# Patient Record
Sex: FEMALE | Race: WHITE | URBAN - METROPOLITAN AREA
[De-identification: names, ages, dates, MRNs, and addresses within clinical notes are randomized per-mention and may not be internally consistent; named-entity substitution may affect disease eponyms.]

---

## 2018-06-02 ENCOUNTER — OFFICE VISIT (OUTPATIENT)
Dept: URGENT CARE | Facility: URGENT CARE | Age: 63
End: 2018-06-02
Payer: MEDICARE

## 2018-06-02 VITALS
WEIGHT: 190 LBS | SYSTOLIC BLOOD PRESSURE: 106 MMHG | RESPIRATION RATE: 15 BRPM | TEMPERATURE: 98.2 F | HEIGHT: 64 IN | OXYGEN SATURATION: 98 % | HEART RATE: 92 BPM | BODY MASS INDEX: 32.44 KG/M2 | DIASTOLIC BLOOD PRESSURE: 72 MMHG

## 2018-06-02 DIAGNOSIS — B02.9 HERPES ZOSTER WITHOUT COMPLICATION: Primary | ICD-10-CM

## 2018-06-02 PROCEDURE — 99213 OFFICE O/P EST LOW 20 MIN: CPT | Performed by: PHYSICIAN ASSISTANT

## 2018-06-02 RX ORDER — VALACYCLOVIR HYDROCHLORIDE 1 G/1
1000 TABLET, FILM COATED ORAL 3 TIMES DAILY
Qty: 21 TABLET | Refills: 0 | Status: SHIPPED | OUTPATIENT
Start: 2018-06-02

## 2018-06-02 NOTE — PROGRESS NOTES
"SUBJECTIVE:   Wendy Garcia is a 63 year old female presenting for evaluation of   Chief Complaint   Patient presents with     Urgent Care     Derm Problem     rash on back of right leg x 3 days.      Rash on her right inner thigh began a few days ago with pain, and just today she noticed a larger red area with bumps. It is itchy and painful. No fever. No other rashes. She does do a lot of gardening. No known chemical or irritant exposures in this area, however. She has had shingles before.    ROS  No fever. Positive for rash. Positive for stress in life recently.    PMH:    Patient Active Problem List    Diagnosis Date Noted     Bilateral plantar fasciitis 04/30/2014     Priority: Medium     Disorder of bursae and tendons in shoulder region 03/03/2006     Priority: Medium     Problem list name updated by automated process. Provider to review       marlene CERVICALGIA 12/27/2005     Priority: Medium         Current medications:  Current Outpatient Prescriptions   Medication Sig Dispense Refill     CELEBREX 200 MG OR CAPS 1 Capsule daily       nitrofurantoin, macrocrystal-monohydrate, (MACROBID) 100 MG capsule Take 1 capsule (100 mg) by mouth 2 times daily 14 capsule 0     valACYclovir (VALTREX) 1000 mg tablet Take 1 tablet (1,000 mg) by mouth 3 times daily 21 tablet 0     WELLBUTRIN SR## 150 MG OR TB12 1 TABLET TWICE DAILY         Surgical History:  No past surgical history on file.    Family history:  No family history on file.      Social History:  Social History   Substance Use Topics     Smoking status: Former Smoker     Smokeless tobacco: Never Used      Comment: 18 years ago     Alcohol use Not on file     Works as a     OBJECTIVE  /72  Pulse 92  Temp 98.2  F (36.8  C) (Oral)  Resp 15  Ht 5' 4\" (1.626 m)  Wt 190 lb (86.2 kg)  SpO2 98%  BMI 32.61 kg/m2    Physical Exam  General: alert, appears well, NAD. Afebrile.  Skin: on the right proximal, posteromedial thigh, there is a 3in x 5in " area of vesicles on an erythematous base. No superficial crusting. No open sores. No induration, warmth, or tenderness.          ASSESSMENT:      ICD-10-CM    1. Herpes zoster without complication B02.9 valACYclovir (VALTREX) 1000 mg tablet        Medical Decision Making:    Differential Diagnosis:  -Herpes zoster- highly suspect this due to rash's appearance and character  -contact dermatitis- less likely due to affected area being on her inner thigh, and more painful than I would expect for this  -cellulitis- no induration, warmth, no visible entrance area    Serious Comorbid Conditions: none    PLAN:  Patient presents within first 72 hours of symptoms of highly likely Herpes Zoster. She is a candidate for antiviral therapy. Valtrex prescribed.  Discussed other supportive cares. Discussed natural history of shingles.    If not improving or if condition worsens, follow up with your Primary Care Provider    Return precautions provided below in patient instructions.  Patient understood and agreed to plan. Patient was appropriate for discharge.      Patient Instructions   1. Take Valtrex three times daily x 7 days. Take with food as it may upset the stomach.  2. Take Tylenol for pain up to every 6 hours.  3. May take Benadryl or Claritin for itching.  4. May apply hydrocortisone to the rash as needed for itching.  5. Follow up with your doctor if no improvement in 1-2 weeks, sooner if worsening or new symptoms.      Shingles  Shingles is a viral infection caused by the same virus as chicken pox. Anyone who has had chicken pox may get shingles later in life. The virus stays in the body, but remains dormant (asleep). Shingles often occurs in older persons or persons with lowered immunity. But it can affect anyone at any age.  Shingles starts as a tingling patch of skin on one side of the body. Small, painful blisters may then appear. The rash does not spread to the rest of the body.  Exposure to shingles cannot cause  shingles. However, it can cause chicken pox in anyone who has not had chicken pox or has not been vaccinated. The contagious period ends when all blisters have crusted over (generally about 2 weeks after the illness begins).  After the blisters heal, the affected skin may be sensitive or painful for months (neuralgia). This often gradually goes away.  A shingles vaccine is available. This can help prevent shingles or make it less painful. It is generally recommended for adults over the age of 60 who have had chicken pox in the past, but who have never had shingles. Adults over 60 who have had neither chicken pox nor shingles can prevent both diseases with the chicken pox vaccine. Ask your healthcare provider about these vaccines.  Home care    Medicines may be prescribed to help relieve pain. Take these medicines as directed. Ask your healthcare provider or pharmacist before using over-the-counter medicines for helping treat pain and itching.    In certain cases, antiviral medicines may be prescribed to reduce pain, shorten the illness, and prevent neuralgia. Take these medicines as directed.    Compresses made from a solution of cool water mixed with cornstarch or baking soda may help relieve pain and itching.     Gently wash skin daily with soap and water to help prevent infection.  Be certain to rinse off all of the soap, which can be irritating.    Trim fingernails and try not to scratch. Scratching the sores may leave scars.    Stay home from work or school until all blisters have formed a crust and you are no longer contagious.  Follow-up care  Follow up with your healthcare provider or as directed by our staff.  When to seek medical advice    Fever of 100.4 F (38 C) or higher, or as directed by your healthcare provider    Affected skin is on the face or neck and any of the following occur:  ? Headache  ? Eye pain  ? Changes in vision  ? Sores near the eye  ? Weakness of facial muscles    Pain, redness, or  swelling of a joint    Signs of skin infection: colored drainage from the sores, warmth, increasing redness, or increasing pain  Date Last Reviewed: 9/25/2015 2000-2017 [employer]. [address] [place] [address]. All rights reserved. This information is not intended as a substitute for professional medical care. Always follow your healthcare professional's instructions.                  Awilda Louis PA-C  06/02/18 6:32 PM

## 2018-06-02 NOTE — PATIENT INSTRUCTIONS
1. Take Valtrex three times daily x 7 days. Take with food as it may upset the stomach.  2. Take Tylenol for pain up to every 6 hours.  3. May take Benadryl or Claritin for itching.  4. May apply hydrocortisone to the rash as needed for itching.  5. Follow up with your doctor if no improvement in 1-2 weeks, sooner if worsening or new symptoms.      Shingles  Shingles is a viral infection caused by the same virus as chicken pox. Anyone who has had chicken pox may get shingles later in life. The virus stays in the body, but remains dormant (asleep). Shingles often occurs in older persons or persons with lowered immunity. But it can affect anyone at any age.  Shingles starts as a tingling patch of skin on one side of the body. Small, painful blisters may then appear. The rash does not spread to the rest of the body.  Exposure to shingles cannot cause shingles. However, it can cause chicken pox in anyone who has not had chicken pox or has not been vaccinated. The contagious period ends when all blisters have crusted over (generally about 2 weeks after the illness begins).  After the blisters heal, the affected skin may be sensitive or painful for months (neuralgia). This often gradually goes away.  A shingles vaccine is available. This can help prevent shingles or make it less painful. It is generally recommended for adults over the age of 60 who have had chicken pox in the past, but who have never had shingles. Adults over 60 who have had neither chicken pox nor shingles can prevent both diseases with the chicken pox vaccine. Ask your healthcare provider about these vaccines.  Home care    Medicines may be prescribed to help relieve pain. Take these medicines as directed. Ask your healthcare provider or pharmacist before using over-the-counter medicines for helping treat pain and itching.    In certain cases, antiviral medicines may be prescribed to reduce pain, shorten the illness, and prevent neuralgia. Take these  medicines as directed.    Compresses made from a solution of cool water mixed with cornstarch or baking soda may help relieve pain and itching.     Gently wash skin daily with soap and water to help prevent infection.  Be certain to rinse off all of the soap, which can be irritating.    Trim fingernails and try not to scratch. Scratching the sores may leave scars.    Stay home from work or school until all blisters have formed a crust and you are no longer contagious.  Follow-up care  Follow up with your healthcare provider or as directed by our staff.  When to seek medical advice    Fever of 100.4 F (38 C) or higher, or as directed by your healthcare provider    Affected skin is on the face or neck and any of the following occur:  ? Headache  ? Eye pain  ? Changes in vision  ? Sores near the eye  ? Weakness of facial muscles    Pain, redness, or swelling of a joint    Signs of skin infection: colored drainage from the sores, warmth, increasing redness, or increasing pain  Date Last Reviewed: 9/25/2015 2000-2017 [employer]. [address] [place] [address], [place] [address]. All rights reserved. This information is not intended as a substitute for professional medical care. Always follow your healthcare professional's instructions.

## 2018-06-02 NOTE — MR AVS SNAPSHOT
After Visit Summary   6/2/2018    Wendy Garcia    MRN: [id number]           Patient Information     Date Of Birth          1955        Visit Information        Provider Department      6/2/2018 5:55 PM Awilda Louis PA-C [facility]        Today's Diagnoses     Herpes zoster without complication    -  1      Care Instructions    1. Take Valtrex three times daily x 7 days. Take with food as it may upset the stomach.  2. Take Tylenol for pain up to every 6 hours.  3. May take Benadryl or Claritin for itching.  4. May apply hydrocortisone to the rash as needed for itching.  5. Follow up with your doctor if no improvement in 1-2 weeks, sooner if worsening or new symptoms.      Shingles  Shingles is a viral infection caused by the same virus as chicken pox. Anyone who has had chicken pox may get shingles later in life. The virus stays in the body, but remains dormant (asleep). Shingles often occurs in older persons or persons with lowered immunity. But it can affect anyone at any age.  Shingles starts as a tingling patch of skin on one side of the body. Small, painful blisters may then appear. The rash does not spread to the rest of the body.  Exposure to shingles cannot cause shingles. However, it can cause chicken pox in anyone who has not had chicken pox or has not been vaccinated. The contagious period ends when all blisters have crusted over (generally about 2 weeks after the illness begins).  After the blisters heal, the affected skin may be sensitive or painful for months (neuralgia). This often gradually goes away.  A shingles vaccine is available. This can help prevent shingles or make it less painful. It is generally recommended for adults over the age of 60 who have had chicken pox in the past, but who have never had shingles. Adults over 60 who have had neither chicken pox nor shingles can prevent both diseases with the chicken pox vaccine. Ask your  healthcare provider about these vaccines.  Home care    Medicines may be prescribed to help relieve pain. Take these medicines as directed. Ask your healthcare provider or pharmacist before using over-the-counter medicines for helping treat pain and itching.    In certain cases, antiviral medicines may be prescribed to reduce pain, shorten the illness, and prevent neuralgia. Take these medicines as directed.    Compresses made from a solution of cool water mixed with cornstarch or baking soda may help relieve pain and itching.     Gently wash skin daily with soap and water to help prevent infection.  Be certain to rinse off all of the soap, which can be irritating.    Trim fingernails and try not to scratch. Scratching the sores may leave scars.    Stay home from work or school until all blisters have formed a crust and you are no longer contagious.  Follow-up care  Follow up with your healthcare provider or as directed by our staff.  When to seek medical advice    Fever of 100.4 F (38 C) or higher, or as directed by your healthcare provider    Affected skin is on the face or neck and any of the following occur:  ? Headache  ? Eye pain  ? Changes in vision  ? Sores near the eye  ? Weakness of facial muscles    Pain, redness, or swelling of a joint    Signs of skin infection: colored drainage from the sores, warmth, increasing redness, or increasing pain  Date Last Reviewed: 9/25/2015 2000-2017 [employer]. [address] [place] [address], [place] [address]. All rights reserved. This information is not intended as a substitute for professional medical care. Always follow your healthcare professional's instructions.                Follow-ups after your visit        Follow-up notes from your care team     Return if symptoms worsen or fail to improve.      Who to contact     If you have questions or need follow up information about today's clinic visit or your schedule please contact [facility]  "URGENT CARE directly at [phone].  Normal or non-critical lab and imaging results will be communicated to you by MyChart, letter or phone within 4 business days after the clinic has received the results. If you do not hear from us within 7 days, please contact the clinic through MyChart or phone. If you have a critical or abnormal lab result, we will notify you by phone as soon as possible.  Submit refill requests through [employer] or call your pharmacy and they will forward the refill request to us. Please allow 3 business days for your refill to be completed.          Additional Information About Your Visit        Care EveryWhere ID     This is your Care EveryWhere ID. This could be used by other organizations to access your [facility] medical records  [id number]        Your Vitals Were     Pulse Temperature Respirations Height Pulse Oximetry BMI (Body Mass Index)    92 98.2  F (36.8  C) (Oral) 15 5' 4\" (1.626 m) 98% 32.61 kg/m2       Blood Pressure from Last 3 Encounters:   06/02/18 106/72   08/08/15 108/72   07/11/10 120/70    Weight from Last 3 Encounters:   06/02/18 190 lb (86.2 kg)   08/08/15 188 lb (85.3 kg)   12/15/06 170 lb (77.1 kg)              Today, you had the following     No orders found for display         Today's Medication Changes          These changes are accurate as of 6/2/18  6:23 PM.  If you have any questions, ask your nurse or doctor.               Start taking these medicines.        Dose/Directions    valACYclovir 1000 mg tablet   Commonly known as:  VALTREX   Used for:  Herpes zoster without complication   Started by:  Awilda Louis PA-C        Dose:  1000 mg   Take 1 tablet (1,000 mg) by mouth 3 times daily   Quantity:  21 tablet   Refills:  0            Where to get your medicines      These medications were sent to [id number] - [place], [place] - [address] AT [facility]  [address], [place] [address]     Phone:  [phone]     " valACYclovir 1000 mg tablet                Primary Care Provider Fax #    Physician No Ref-Primary [phone]       No address on file        Equal Access to Services     LEO PASCAL : Hadii aad ku hadlyndsay Burgess, jesi tan, dionne lawrence, delvin [facility] [place] [phone].    ATENCIÓN: Si habla español, tiene a presley disposición servicios gratuitos de asistencia lingüística. Llame al [phone].    We comply with applicable federal civil rights laws and [place] laws. We do not discriminate on the basis of race, color, national origin, age, disability, sex, sexual orientation, or gender identity.            Thank you!     Thank you for choosing [facility]  for your care. Our goal is always to provide you with excellent care. Hearing back from our patients is one way we can continue to improve our services. Please take a few minutes to complete the written survey that you may receive in the mail after your visit with us. Thank you!             Your Updated Medication List - Protect others around you: Learn how to safely use, store and throw away your medicines at www.disposemymeds.org.          This list is accurate as of 6/2/18  6:23 PM.  Always use your most recent med list.                   Brand Name Dispense Instructions for use Diagnosis    celeBREX 200 MG capsule   Generic drug:  celecoxib      1 Capsule daily        nitroFURantoin (macrocrystal-monohydrate) 100 MG capsule    MACROBID    14 capsule    Take 1 capsule (100 mg) by mouth 2 times daily    UTI (urinary tract infection)       valACYclovir 1000 mg tablet    VALTREX    21 tablet    Take 1 tablet (1,000 mg) by mouth 3 times daily    Herpes zoster without complication       WELLBUTRIN  MG 12 hr tablet   Generic drug:  buPROPion      1 TABLET TWICE DAILY

## 2019-12-20 ENCOUNTER — TRANSFERRED RECORDS (OUTPATIENT)
Dept: HEALTH INFORMATION MANAGEMENT | Facility: CLINIC | Age: 64
End: 2019-12-20

## 2019-12-20 ENCOUNTER — MEDICAL CORRESPONDENCE (OUTPATIENT)
Dept: HEALTH INFORMATION MANAGEMENT | Facility: CLINIC | Age: 64
End: 2019-12-20

## 2020-05-20 ENCOUNTER — RECORDS - HEALTHEAST (OUTPATIENT)
Dept: ADMINISTRATIVE | Facility: OTHER | Age: 65
End: 2020-05-20

## 2020-05-21 ENCOUNTER — RECORDS - HEALTHEAST (OUTPATIENT)
Dept: ADMINISTRATIVE | Facility: OTHER | Age: 65
End: 2020-05-21

## 2020-05-21 ENCOUNTER — AMBULATORY - HEALTHEAST (OUTPATIENT)
Dept: CARDIOLOGY | Facility: CLINIC | Age: 65
End: 2020-05-21

## 2020-05-21 DIAGNOSIS — R55 SYNCOPE: ICD-10-CM

## 2020-06-01 ENCOUNTER — HOSPITAL ENCOUNTER (OUTPATIENT)
Dept: MRI IMAGING | Facility: CLINIC | Age: 65
Discharge: HOME | End: 2020-06-01
Attending: PSYCHIATRY & NEUROLOGY

## 2020-06-01 ENCOUNTER — HOSPITAL ENCOUNTER (OUTPATIENT)
Dept: ULTRASOUND IMAGING | Facility: CLINIC | Age: 65
Discharge: HOME | End: 2020-06-01
Attending: PSYCHIATRY & NEUROLOGY

## 2020-06-01 DIAGNOSIS — R55 SYNCOPE: ICD-10-CM

## 2020-06-01 LAB
CREAT BLD-MCNC: 0.8 MG/DL (ref 0.6–1.1)
GFR SERPL CREATININE-BSD FRML MDRD: >60 ML/MIN/1.73M2

## 2020-06-05 ENCOUNTER — HOSPITAL ENCOUNTER (OUTPATIENT)
Dept: CARDIOLOGY | Facility: CLINIC | Age: 65
Discharge: HOME | End: 2020-06-05
Attending: PSYCHIATRY & NEUROLOGY

## 2020-06-05 DIAGNOSIS — R55 SYNCOPE: ICD-10-CM

## 2020-06-05 LAB
AORTIC ROOT: 3.2 CM
ATRIAL RATE - MUSE: 76 BPM
BSA FOR ECHO PROCEDURE: 1.94 M2
CV BLOOD PRESSURE: ABNORMAL MMHG
CV ECHO HEIGHT: 64 IN
CV ECHO WEIGHT: 185 LBS
DIASTOLIC BLOOD PRESSURE - MUSE: NORMAL
DOP CALC LVOT AREA: 2.54 CM2
DOP CALC LVOT DIAMETER: 1.8 CM
DOP CALC LVOT PEAK VEL: 126 CM/S
DOP CALC LVOT STROKE VOLUME: 64.3 CM3
DOP CALCLVOT PEAK VEL VTI: 25.3 CM
EJECTION FRACTION: 60 % (ref 55–75)
FRACTIONAL SHORTENING: 37.4 % (ref 28–44)
INTERPRETATION ECG - MUSE: NORMAL
INTERVENTRICULAR SEPTUM IN END DIASTOLE: 1.2 CM (ref 0.6–0.9)
IVS/PW RATIO: 1.2
LA AREA 1: 16 CM2
LA AREA 2: 16.5 CM2
LEFT ATRIUM LENGTH: 5.3 CM
LEFT ATRIUM SIZE: 3.2 CM
LEFT ATRIUM TO AORTIC ROOT RATIO: 1 NO UNITS
LEFT ATRIUM VOLUME INDEX: 21.8 ML/M2
LEFT ATRIUM VOLUME: 42.3 ML
LEFT VENTRICLE CARDIAC INDEX: 2.8 L/MIN/M2
LEFT VENTRICLE CARDIAC OUTPUT: 5.3 L/MIN
LEFT VENTRICLE DIASTOLIC VOLUME INDEX: 30.5 CM3/M2 (ref 29–61)
LEFT VENTRICLE DIASTOLIC VOLUME: 59.1 CM3 (ref 46–106)
LEFT VENTRICLE HEART RATE: 83 BPM
LEFT VENTRICLE MASS INDEX: 96.7 G/M2
LEFT VENTRICLE SYSTOLIC VOLUME INDEX: 12.2 CM3/M2 (ref 8–24)
LEFT VENTRICLE SYSTOLIC VOLUME: 23.6 CM3 (ref 14–42)
LEFT VENTRICULAR INTERNAL DIMENSION IN DIASTOLE: 4.7 CM (ref 3.8–5.2)
LEFT VENTRICULAR INTERNAL DIMENSION IN SYSTOLE: 2.94 CM (ref 2.2–3.5)
LEFT VENTRICULAR MASS: 187.5 G
LEFT VENTRICULAR OUTFLOW TRACT MEAN GRADIENT: 3 MMHG
LEFT VENTRICULAR OUTFLOW TRACT MEAN VELOCITY: 82 CM/S
LEFT VENTRICULAR OUTFLOW TRACT PEAK GRADIENT: 6 MMHG
LEFT VENTRICULAR POSTERIOR WALL IN END DIASTOLE: 1 CM (ref 0.6–0.9)
LV STROKE VOLUME INDEX: 33.2 ML/M2
MITRAL VALVE DECELERATION SLOPE: 4050 MM/S2
MITRAL VALVE E/A RATIO: 0.9
MITRAL VALVE PRESSURE HALF-TIME: 67 MS
MV AVERAGE E/E' RATIO: 10.3 CM/S
MV DECELERATION TIME: 229 MS
MV E'TISSUE VEL-LAT: 10.8 CM/S
MV E'TISSUE VEL-MED: 7.25 CM/S
MV LATERAL E/E' RATIO: 8.6
MV MEDIAL E/E' RATIO: 12.8
MV PEAK A VELOCITY: 107 CM/S
MV PEAK E VELOCITY: 92.6 CM/S
MV VALVE AREA PRESSURE 1/2 METHOD: 3.3 CM2
NUC REST DIASTOLIC VOLUME INDEX: 2960 LBS
NUC REST SYSTOLIC VOLUME INDEX: 64 IN
P AXIS - MUSE: 69 DEGREES
PR INTERVAL - MUSE: 160 MS
QRS DURATION - MUSE: 80 MS
QT - MUSE: 372 MS
QTC - MUSE: 418 MS
R AXIS - MUSE: 65 DEGREES
SYSTOLIC BLOOD PRESSURE - MUSE: NORMAL
T AXIS - MUSE: 52 DEGREES
TRICUSPID REGURGITATION PEAK PRESSURE GRADIENT: 11.2 MMHG
TRICUSPID VALVE ANULAR PLANE SYSTOLIC EXCURSION: 2.4 CM
TRICUSPID VALVE PEAK REGURGITANT VELOCITY: 167 CM/S
VENTRICULAR RATE- MUSE: 76 BPM

## 2020-06-05 ASSESSMENT — MIFFLIN-ST. JEOR: SCORE: 1354.15

## 2020-06-10 ENCOUNTER — OFFICE VISIT (OUTPATIENT)
Dept: NEUROLOGY | Facility: CLINIC | Age: 65
End: 2020-06-10
Payer: COMMERCIAL

## 2020-06-10 VITALS — BODY MASS INDEX: 31.58 KG/M2 | WEIGHT: 185 LBS | HEIGHT: 64 IN

## 2020-06-10 DIAGNOSIS — G90.01 CAROTID SINUS SYNCOPE: Primary | ICD-10-CM

## 2020-06-10 PROBLEM — I10 HYPERTENSION: Status: ACTIVE | Noted: 2020-03-03

## 2020-06-10 PROCEDURE — 99214 OFFICE O/P EST MOD 30 MIN: CPT | Mod: 95 | Performed by: PSYCHIATRY & NEUROLOGY

## 2020-06-10 RX ORDER — LOSARTAN POTASSIUM 50 MG/1
TABLET ORAL
COMMUNITY
Start: 2020-05-27

## 2020-06-10 ASSESSMENT — MIFFLIN-ST. JEOR: SCORE: 1359.15

## 2020-06-10 NOTE — PROGRESS NOTES
"NEUROLOGY FOLLOW UP VISIT  NOTE       [facility]UROLOG[facility] [place]  [address] [place] [address]  Tel: [phone]  Fax: [phone]  [email or web address]     ISABELA Fish 1955, MRN [id number]  PCP: No Ref-Primary, Physician, None    Date: 6/10/2020     ASSESSMENT & PLAN     Diagnosis code: Data Unavailable    Syncope    Patient is a 65-year-old female with history of hypertension, ADHD who was referred for an episode of syncope on 3/27/2020.  Work-up so far includes MRI of the head with and without contrast, echocardiogram, EKG and carotid ultrasound that was unremarkable.  EEG is still pending    Postconcussion syndrome    Patient continues to have memory difficulty and somewhat slow in answering question that likely is due to postconcussion syndrome and I have reassured her that symptoms do tend to improve.  Follow-up will be in 3 months.      Thank you again for this referral, please feel free to contact me if you have any questions.    Moris Manuel MD  [facility] NEUROLOGY, [place]    VIDEO VISIT CONSENT  Patient is being evaluated via a billable video visit. The patient has been notified of following:     \"This video visit will be conducted via a call between you and your physician/provider. We have found that certain health care needs can be provided without the need for an in-person physical exam. This service lets us provide the care you need with a video conversation. If a prescription is necessary we can send it directly to your pharmacy. If lab work is needed we can place an order for that and you can then stop by our lab to have the test done at a later time. If during the course of the call the physician/provider feels a video visit is not appropriate, you will not be charged for this service.\"    Physician has received verbal consent for a Video Visit from the patient? yes  Patient would like the video invitation sent by: E mail     VIDEO VISIT DETAILS  Type " of service: Video Visit  Video Start Time: 12:10 PM  Video End Time (time video stopped): 12:23 PM  Originating Location (Patient Location): Patient's Home  Distant Location (provider location): [facility] [place]   Mode of Communication: Video Conference via [x][employer] [][employer]ity     HISTORY OF PRESENT ILLNESS     We have been requested by Dr. Troncoso to evaluate Wendy Garcia who is a 65 year old  female for episode of syncope    Patient is a 65-year-old female with history of hypertension, ADHD who was referred for evaluation of her syncope on 3/27/2020.  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  She started some antihypertensive medication and as she stood up she passed out.  As she lives alone she is not sure how long she was out.  When she regained consciousness she realized that she had vomited and broken her glasses and had a bruise on her left eye.  She was fatigued the following day.  Subsequently she had MRI of the head, echocardiogram, EKG and carotid ultrasound that was normal.  EEG is still pending.  Initially she was complaining of word finding difficulty and trouble spelling     PROBLEM LIST   Patient Active Problem List    Diagnosis Date Noted     Hypertension 03/03/2020     Priority: Medium     Vitamin D deficiency 12/07/2015     Priority: Medium     Bilateral plantar fasciitis 04/30/2014     Priority: Medium     Posttraumatic stress disorder 02/02/2011     Priority: Medium     Dysthymic disorder 12/10/2010     Priority: Medium     Last Assessment & Plan:   Will continue with previous treatment plan.   Focus is to help her stay in the present moment, to stay consistent in her search for employment, practice self care, work at decreasing consumption of alcohol, learning to not personalize other's behaviors. Gerri Cisneros PsyD, LP  11/5/2015   2:56 PM       Attention deficit  "hyperactivity disorder (ADHD) 11/03/2010     Priority: Medium     Disorder of bursae and tendons in shoulder region 03/03/2006     Priority: Medium     Problem list name updated by automated process. Provider to review       marlene CERVICALGIA 12/27/2005     Priority: Medium        PAST MEDICAL & SURGICAL HISTORY     Past Medical History:   Patient  has no past medical history on file.    Surgical History:  She  has no past surgical history on file.     SOCIAL HISTORY     Reviewed, and she  reports that she has quit smoking. She has never used smokeless tobacco.     FAMILY HISTORY     Reviewed, and family history includes Cancer in her brother, father, mother, and sister; Heart Disease in her brother; Hypertension in her brother.     ALLERGIES     Allergies   Allergen Reactions     Cats      Mold      Penicillins Other (See Comments)         REVIEW OF SYSTEMS     A comprehensive review of systems was negative.     HOME MEDICATIONS     Prior to Admission medications    Medication Sig Start Date End Date Taking? Authorizing Provider   losartan (COZAAR) 50 MG tablet  5/27/20  Yes Reported, Patient   CELEBREX 200 MG OR CAPS 1 Capsule daily    Reported, Patient   nitrofurantoin, macrocrystal-monohydrate, (MACROBID) 100 MG capsule Take 1 capsule (100 mg) by mouth 2 times daily  Patient not taking: Reported on 6/10/2020 8/8/15   Yolanda Pulido MD   valACYclovir (VALTREX) 1000 mg tablet Take 1 tablet (1,000 mg) by mouth 3 times daily  Patient not taking: Reported on 6/10/2020 6/2/18   Awilda Louis PA-C   WELLBUTRIN SR## 150 MG OR TB12 1 TABLET TWICE DAILY    Reported, Patient         PHYSICAL EXAM     Vital signs  Ht 1.626 m (5' 4\")   Wt 83.9 kg (185 lb)   BMI 31.76 kg/m      Weight:   185 lbs 0 oz    General Physical Exam: Patient is alert and oriented x 3. Vital signs were reviewed and are documented in EMR. Neck was supple, no carotid bruit, thyromegaly, JVD or lymphadenopathy noted.  Neurological " Exam:  Patient is alert and oriented times 4 in no acute distress. Vital signs were reviewed and are documented in electronic medical record. Speech was normal patient was able to name objects, parts of objects. Extraocular movements are intact face was symmetrical tongue was midline. Patient moves all 4 extremities equally. Finger-nose testing was normal. Gait testing was normal. Sensation could not be tested     DIAGNOSTIC STUDIES     PERTINENT RADIOLOGY  Following imaging studies were reviewed      EKG  Normal sinus rhythm   Normal ECG   No previous ECGs available     MRI  6/1/20  1.  No acute intracranial process.  2.  Generalized brain atrophy and presumed microvascular ischemic changes as detailed above.    EEG  pending    Echocardiogram  6/5/20    No previous study for comparison.    Normal left ventricular size. Mild left ventricular hypertrophy.    Left ventricle ejection fraction is normal. The calculated left ventricular ejection fraction is 60%.    Normal right ventricular size and systolic function.    No significant valve abnormality noted.      Carotid Ultrasound  6/1/20  1.  Mild plaque formation, velocities consistent with less than 50% stenosis in the right internal carotid artery.  2.  Mild plaque formation, velocities consistent with less than 50% stenosis in the left internal carotid artery.  3.  Flow within the vertebral arteries is antegrade.      PERTINENT LABS  Following labs were reviewed    No visits with results within 3 Month(s) from this visit.   Latest known visit with results is:   Office Visit on 08/08/2015   Component Date Value Ref Range Status     Color Urine 08/08/2015 Yellow   Final     Appearance Urine 08/08/2015 Clear   Final     Glucose Urine 08/08/2015 Negative  NEG mg/dL Final     Bilirubin Urine 08/08/2015 Negative  NEG Final     Ketones Urine 08/08/2015 Negative  NEG mg/dL Final     Specific Gravity Urine 08/08/2015 1.015  1.003 - 1.035 Final     Blood Urine 08/08/2015  Moderate* NEG Final     pH Urine 08/08/2015 7.0  5.0 - 7.0 pH Final     Protein Albumin Urine 08/08/2015 30* NEG mg/dL Final     Urobilinogen Urine 08/08/2015 0.2  0.2 - 1.0 EU/dL Final     Nitrite Urine 08/08/2015 Negative  NEG Final     Leukocyte Esterase Urine 08/08/2015 Large* NEG Final     Source 08/08/2015 Midstream Urine   Final     Specimen Description 08/08/2015 Midstream Urine   Final     Culture Micro 08/08/2015 10,000 to 50,000 colonies/mL Escherichia coli*  Final     Micro Report Status 08/08/2015 FINAL 08/10/2015   Final     Organism: 08/08/2015 10,000 to 50,000 colonies/mL Escherichia coli   Final     WBC Urine 08/08/2015 25-50* 0 - 2 /HPF Final     RBC Urine 08/08/2015 10-25* 0 - 2 /HPF Final     Squamous Epithelial /LPF Urine 08/08/2015 Few  FEW /LPF Final     Bacteria Urine 08/08/2015 Moderate* NEG /HPF Final        Total time spent for face to face visit, reviewing labs/imaging studies, counseling and coordination of care was: 30 Minutes More than 50% of this time was spent on counseling and coordination of care.      This note was dictated using voice recognition software.  Any grammatical or context distortions are unintentional and inherent to the software.

## 2020-06-10 NOTE — PATIENT INSTRUCTIONS
"  Patient Education   Concussion Discharge Instructions  You were seen today for signs of a concussion. The symptoms will vary, depending on the nature of your injury and your health. You may have: headache, confusion, nausea (feel sick to your stomach), vomiting (throwing up) and problems with memory, concentrating or sleep. You may feel dizzy, irritable, and tired.   Children and teens may need help from their parents, teachers and coaches to watch for symptoms as they recover.  Follow-up  It is important for you to see a doctor for follow-up care to see how you are recovering. Please see your primary doctor within the next 5 to 7 days. You may have also been referred to the Concussion  service. They will contact you and arrange a follow-up visit if needed. If you need help sooner, you may call them at [phone].  Warning signs  Call your doctor or come back to Emergency if you suddenly have any of these symptoms:    Headaches that get worse    Feeling more and more drowsy    You keep repeating yourself    Strange behavior    Seizures    Repeat vomiting (throwing up)    Trouble walking    Growing confusion    Feeling more irritable    Neck pain that gets worse    Slurred speech    Weakness or numbness    Loss of consciousness    Fluid or blood coming from ears or nose  Self-care    Get lots of rest and get enough sleep at night. Take daytime naps or rest if you feel tired.    Limit physical activity and \"thinking\" activities. These can make symptoms worse.  ? Physical activity includes gym, sports, weight training, running, exercise and heavy lifting.  ? Thinking activities include homework, class work, job-related work and screen time (phone, computer, tablet, TV and video games).    Stick to a healthy diet and drink lots of fluids.    As symptoms improve, you may slowly return to your daily activities. If symptoms get worse   or return, reduce your activity.    Know that it is normal to feel sad and " frustrated when you do not feel right and are less active.  Going back to work    Your care team will tell you when you are ready to return to work.    Limit the amount of work you do soon after your injury. This may speed healing. Take breaks if your symptoms get worse. You should also reduce your physical activity as well as activities that require a lot of thinking until you see your doctor.    You may need shorter work days and a lighter workload.    Avoid heavy lifting, working with machinery, driving and working at heights until your symptoms are gone or you are cleared by a doctor.  Returning to sports    Never return to play if you have any symptoms. A full recovery will reduce the chances of getting hurt again. Remember, it is better to miss one or two games than a whole season.    You should rest from all physical activity until you see your doctor. Generally, if all symptoms have completely cleared, your doctor can help guide you to slowly return to sports. If symptoms return or worsen, stop the activity and see your doctor.    Important: If you are in an organized sport and under age 18, you will need written consent from a healthcare provider before you return to sports. Typically, this will be your primary care or sports medicine doctor. Please make an appointment.  Going back to school    If you are still having symptoms, you may need extra help at school.    Tell your teachers and school nurse about your injury and symptoms. Ask them to watch for problems with learning, memory and concentrating. Symptoms may get worse when you do schoolwork, and you may become more irritable.    You may need shorter school days, a reduced workload, and to postpone testing.    Do not drive or take gym class (physical activity) until cleared by a doctor.    For informational purposes only. Not to replace the advice of your health care provider.   2009 [employer]. Used with permission.  "This form is adapted from the \"Heads Up: Brain Injury in Your Practice\" tool kit developed by the Centers for Disease Control and Prevention (CDC). All rights reserved. [facility]. [employer] 924200ro - Rev 03/17.       "

## 2020-06-10 NOTE — NURSING NOTE
Chief Complaint   Patient presents with     Results     Follow up on EKG, Echo, MRI and US. Has not had EEG yet Email: [email or web address]     Sandy Hurtado CMA on 6/10/2020 at 11:31 AM

## 2020-06-10 NOTE — LETTER
"    6/10/2020         RE: Wendy Garcia  [address] [place] [address]        Dear Colleague,    Thank you for referring your patient, Wendy Garcia, to the [facility] NEUROLOG[facility] [place]. Please see a copy of my visit note below.    NEUROLOGY FOLLOW UP VISIT  NOTE       [facility] NEUROLOG[facility] [place]  [address] [place] [address]  Tel: [phone]  Fax: [phone]  [email or web address]     Wendy Garcia,  1955, MRN [id number]  PCP: No Ref-Primary, Physician, None    Date: 6/10/2020     ASSESSMENT & PLAN     Diagnosis code: Data Unavailable    Syncope    Patient is a 65-year-old female with history of hypertension, ADHD who was referred for an episode of syncope on 3/27/2020.  Work-up so far includes MRI of the head with and without contrast, echocardiogram, EKG and carotid ultrasound that was unremarkable.  EEG is still pending    Postconcussion syndrome    Patient continues to have memory difficulty and somewhat slow in answering question that likely is due to postconcussion syndrome and I have reassured her that symptoms do tend to improve.  Follow-up will be in 3 months.      Thank you again for this referral, please feel free to contact me if you have any questions.    Moris Manuel MD  [facility] NEUROLOGY, [place]    VIDEO VISIT CONSENT  Patient is being evaluated via a billable video visit. The patient has been notified of following:     \"This video visit will be conducted via a call between you and your physician/provider. We have found that certain health care needs can be provided without the need for an in-person physical exam. This service lets us provide the care you need with a video conversation. If a prescription is necessary we can send it directly to your pharmacy. If lab work is needed we can place an order for that and you can then stop by our lab to have the test done at a later time. If during the course of the call the " "physician/provider feels a video visit is not appropriate, you will not be charged for this service.\"    Physician has received verbal consent for a Video Visit from the patient? yes  Patient would like the video invitation sent by: E mail     VIDEO VISIT DETAILS  Type of service: Video Visit  Video Start Time: 12:10 PM  Video End Time (time video stopped): 12:23 PM  Originating Location (Patient Location): Patient's Home  Distant Location (provider location): [facility] [place]   Mode of Communication: Video Conference via [x][employer] []Doximity     HISTORY OF PRESENT ILLNESS     We have been requested by Dr. Troncoso to evaluate Wendy Garcia who is a 65 year old  female for episode of syncope    Patient is a 65-year-old female with history of hypertension, ADHD who was referred for evaluation of her syncope on 3/27/2020.  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  She started some antihypertensive medication and as she stood up she passed out.  As she lives alone she is not sure how long she was out.  When she regained consciousness she realized that she had vomited and broken her glasses and had a bruise on her left eye.  She was fatigued the following day.  Subsequently she had MRI of the head, echocardiogram, EKG and carotid ultrasound that was normal.  EEG is still pending.  Initially she was complaining of word finding difficulty and trouble spelling     PROBLEM LIST   Patient Active Problem List    Diagnosis Date Noted     Hypertension 03/03/2020     Priority: Medium     Vitamin D deficiency 12/07/2015     Priority: Medium     Bilateral plantar fasciitis 04/30/2014     Priority: Medium     Posttraumatic stress disorder 02/02/2011     Priority: Medium     Dysthymic disorder 12/10/2010     Priority: Medium     Last Assessment & Plan:   Will continue with previous treatment plan.   Focus is " "to help her stay in the present moment, to stay consistent in her search for employment, practice self care, work at decreasing consumption of alcohol, learning to not personalize other's behaviors. Gerri Cisneros [place] LP  11/5/2015   2:56 PM       Attention deficit hyperactivity disorder (ADHD) 11/03/2010     Priority: Medium     Disorder of bursae and tendons in shoulder region 03/03/2006     Priority: Medium     Problem list name updated by automated process. Provider to review       marlene CERVICALGIA 12/27/2005     Priority: Medium        PAST MEDICAL & SURGICAL HISTORY     Past Medical History:   Patient  has no past medical history on file.    Surgical History:  She  has no past surgical history on file.     SOCIAL HISTORY     Reviewed, and she  reports that she has quit smoking. She has never used smokeless tobacco.     FAMILY HISTORY     Reviewed, and family history includes Cancer in her brother, father, mother, and sister; Heart Disease in her brother; Hypertension in her brother.     ALLERGIES     Allergies   Allergen Reactions     Cats      Mold      Penicillins Other (See Comments)         REVIEW OF SYSTEMS     A comprehensive review of systems was negative.     HOME MEDICATIONS     Prior to Admission medications    Medication Sig Start Date End Date Taking? Authorizing Provider   losartan (COZAAR) 50 MG tablet  5/27/20  Yes Reported, Patient   CELEBREX 200 MG OR CAPS 1 Capsule daily    Reported, Patient   nitrofurantoin, macrocrystal-monohydrate, (MACROBID) 100 MG capsule Take 1 capsule (100 mg) by mouth 2 times daily  Patient not taking: Reported on 6/10/2020 8/8/15   Yolanda Pulido MD   valACYclovir (VALTREX) 1000 mg tablet Take 1 tablet (1,000 mg) by mouth 3 times daily  Patient not taking: Reported on 6/10/2020 6/2/18   Awilda Louis PA-C   WELLBUTRIN SR## 150 MG OR TB12 1 TABLET TWICE DAILY    Reported, Patient         PHYSICAL EXAM     Vital signs  Ht 1.626 m (5' 4\")   Wt 83.9 kg " (185 lb)   BMI 31.76 kg/m      Weight:   185 lbs 0 oz    General Physical Exam: Patient is alert and oriented x 3. Vital signs were reviewed and are documented in EMR. Neck was supple, no carotid bruit, thyromegaly, JVD or lymphadenopathy noted.  Neurological Exam:  Patient is alert and oriented times 4 in no acute distress. Vital signs were reviewed and are documented in electronic medical record. Speech was normal patient was able to name objects, parts of objects. Extraocular movements are intact face was symmetrical tongue was midline. Patient moves all 4 extremities equally. Finger-nose testing was normal. Gait testing was normal. Sensation could not be tested     DIAGNOSTIC STUDIES     PERTINENT RADIOLOGY  Following imaging studies were reviewed      EKG  Normal sinus rhythm   Normal ECG   No previous ECGs available     MRI  6/1/20  1.  No acute intracranial process.  2.  Generalized brain atrophy and presumed microvascular ischemic changes as detailed above.    EEG  pending    Echocardiogram  6/5/20    No previous study for comparison.    Normal left ventricular size. Mild left ventricular hypertrophy.    Left ventricle ejection fraction is normal. The calculated left ventricular ejection fraction is 60%.    Normal right ventricular size and systolic function.    No significant valve abnormality noted.      Carotid Ultrasound  6/1/20  1.  Mild plaque formation, velocities consistent with less than 50% stenosis in the right internal carotid artery.  2.  Mild plaque formation, velocities consistent with less than 50% stenosis in the left internal carotid artery.  3.  Flow within the vertebral arteries is antegrade.      PERTINENT LABS  Following labs were reviewed    No visits with results within 3 Month(s) from this visit.   Latest known visit with results is:   Office Visit on 08/08/2015   Component Date Value Ref Range Status     Color Urine 08/08/2015 Yellow   Final     Appearance Urine 08/08/2015 Clear    Final     Glucose Urine 08/08/2015 Negative  NEG mg/dL Final     Bilirubin Urine 08/08/2015 Negative  NEG Final     Ketones Urine 08/08/2015 Negative  NEG mg/dL Final     Specific Gravity Urine 08/08/2015 1.015  1.003 - 1.035 Final     Blood Urine 08/08/2015 Moderate* NEG Final     pH Urine 08/08/2015 7.0  5.0 - 7.0 pH Final     Protein Albumin Urine 08/08/2015 30* NEG mg/dL Final     Urobilinogen Urine 08/08/2015 0.2  0.2 - 1.0 EU/dL Final     Nitrite Urine 08/08/2015 Negative  NEG Final     Leukocyte Esterase Urine 08/08/2015 Large* NEG Final     Source 08/08/2015 Midstream Urine   Final     Specimen Description 08/08/2015 Midstream Urine   Final     Culture Micro 08/08/2015 10,000 to 50,000 colonies/mL Escherichia coli*  Final     Micro Report Status 08/08/2015 FINAL 08/10/2015   Final     Organism: 08/08/2015 10,000 to 50,000 colonies/mL Escherichia coli   Final     WBC Urine 08/08/2015 25-50* 0 - 2 /HPF Final     RBC Urine 08/08/2015 10-25* 0 - 2 /HPF Final     Squamous Epithelial /LPF Urine 08/08/2015 Few  FEW /LPF Final     Bacteria Urine 08/08/2015 Moderate* NEG /HPF Final        Total time spent for face to face visit, reviewing labs/imaging studies, counseling and coordination of care was: 30 Minutes More than 50% of this time was spent on counseling and coordination of care.      This note was dictated using voice recognition software.  Any grammatical or context distortions are unintentional and inherent to the software.               Again, thank you for allowing me to participate in the care of your patient.        Sincerely,        Moris Manuel MD

## 2020-08-19 ENCOUNTER — TELEPHONE (OUTPATIENT)
Dept: NEUROLOGY | Facility: CLINIC | Age: 65
End: 2020-08-19

## 2020-08-19 DIAGNOSIS — G90.01 CAROTID SINUS SYNCOPE: Primary | ICD-10-CM

## 2020-08-19 NOTE — TELEPHONE ENCOUNTER
Felicia called Wendy to schedule her EEG and was quite upset that we took so long to call her. She got loud with Felicia and hung up on her without scheduling the EEG. Would you like to at least generate a results letter? She had a MRI, Echo. EKG and US  Sandy Hurtado CMA on 8/19/2020 at 1:38 PM

## 2020-08-19 NOTE — TELEPHONE ENCOUNTER
I called her to schedule the EEG that she states we have not been able to do for awhile. She declined to schedule at time of call because she was so upset. So EEG is not scheduled currently. She has other things going on that made her upset about the call and choose not to schedule this at this time.

## 2020-08-19 NOTE — TELEPHONE ENCOUNTER
Study Result     EXAM: MR BRAIN W WO CONTRAST  LOCATION: [facility]  DATE/TIME: 6/1/2020 5:46 PM     INDICATION: Syncope and collapse.  COMPARISON: None.  CONTRAST: 7.5 mL Gadavist.  TECHNIQUE: Routine multiplanar multisequence head MRI without and with intravenous contrast.     FINDINGS:  INTRACRANIAL CONTENTS: No acute or subacute infarct. No mass, acute hemorrhage, or extra-axial fluid collections. Scattered nonspecific T2/FLAIR hyperintensities within the cerebral white matter most consistent with mild chronic microvascular ischemic   change. Mild generalized cerebral atrophy. No hydrocephalus. Normal position of the cerebellar tonsils. No pathologic contrast enhancement.     SELLA: No abnormality accounting for technique.     OSSEOUS STRUCTURES/SOFT TISSUES: Normal marrow signal. The major intracranial vascular flow voids are maintained.      ORBITS: Prior bilateral cataract surgery. Visualized portions of the orbits are otherwise unremarkable.      SINUSES/MASTOIDS: Mild mucosal thickening scattered about the paranasal sinuses. No middle ear or mastoid effusion.      IMPRESSION:   1.  No acute intracranial process.  2.  Generalized brain atrophy and presumed microvascular ischemic changes as detailed above.

## 2020-08-19 NOTE — TELEPHONE ENCOUNTER
Study Result     EXAM: US CAROTID BILATERAL  LOCATION: [facility]  DATE/TIME: 6/1/2020 4:04 PM     INDICATION: Syncope and collapse  COMPARISON: None.  TECHNIQUE: Duplex exam performed utilizing 2D gray-scale imaging, Doppler interrogation with color-flow and spectral waveform analysis. The percent diameter stenosis is determined using NASCET criteria and Society of Radiologists in Ultrasound Consensus   Criteria.     FINDINGS:     RIGHT: Mild plaque at the bifurcation. The peak systolic velocity in the ICA is less than 125 cm/sec, consistent with less than 50% stenosis. Normal velocities in the ECA. Antegrade flow within the vertebral artery.      LEFT: Mild plaque at the bifurcation. The peak systolic velocity in the ICA is less than 125 cm/sec, consistent with less than 50% stenosis. Normal velocities in the ECA. Antegrade flow within the vertebral artery.     VELOCITY CHART:  CCA   Right: 91/25 cm/s   Left: 85/36 cm/s  ICA   Right: 99/42 cm/s   Left: 107/40 cm/s  ECA   Right: 105 cm/s   Left: 120 cm/s  ICA/CCA PSV Ratio   Right: 1.1   Left: 1.3     IMPRESSION:   1.  Mild plaque formation, velocities consistent with less than 50% stenosis in the right internal carotid artery.  2.  Mild plaque formation, velocities consistent with less than 50% stenosis in the left internal carotid artery.  3.  Flow within the vertebral arteries is antegrade.

## 2020-08-19 NOTE — TELEPHONE ENCOUNTER
Results   ECG 12 lead with MUSE (Order [id number])   Order Questions     Question  Answer  Comment    Reason for exam  syncope             ECG 12 lead with MUSE   Order: [id number]   Status:  Final result   Visible to patient:  Yes (MyChart) Next appt:  None Dx:  Syncope     Ref Range & Units  6/5/20 1322     SYSTOLIC BLOOD PRESSURE        DIASTOLIC BLOOD PRESSURE        VENTRICULAR RATE  BPM  76       ATRIAL RATE  BPM  76       P-R INTERVAL  ms  160       QRS DURATION  ms  80       Q-T INTERVAL  ms  372       QTC CALCULATION (BEZET)  ms  418       P Axis  degrees  69       R AXIS  degrees  65       T AXIS  degrees  52       MUSE DIAGNOSIS   Normal sinus rhythm   Normal ECG   No previous ECGs available   Confirmed by TONI SINCLAIR MD LOC:[id number]) on 6/5/2020 3:40:02 PM

## 2021-06-04 VITALS — HEIGHT: 64 IN | BODY MASS INDEX: 31.58 KG/M2 | WEIGHT: 185 LBS

## 2021-08-28 ENCOUNTER — HEALTH MAINTENANCE LETTER (OUTPATIENT)
Age: 66
End: 2021-08-28

## 2021-10-23 ENCOUNTER — HEALTH MAINTENANCE LETTER (OUTPATIENT)
Age: 66
End: 2021-10-23

## 2022-10-10 ENCOUNTER — HEALTH MAINTENANCE LETTER (OUTPATIENT)
Age: 67
End: 2022-10-10

## 2023-10-28 ENCOUNTER — HEALTH MAINTENANCE LETTER (OUTPATIENT)
Age: 68
End: 2023-10-28